# Patient Record
Sex: FEMALE | Race: WHITE | NOT HISPANIC OR LATINO | Employment: OTHER | URBAN - NONMETROPOLITAN AREA
[De-identification: names, ages, dates, MRNs, and addresses within clinical notes are randomized per-mention and may not be internally consistent; named-entity substitution may affect disease eponyms.]

---

## 2023-08-23 ENCOUNTER — OFFICE VISIT (OUTPATIENT)
Dept: GASTROENTEROLOGY | Facility: CLINIC | Age: 53
End: 2023-08-23
Payer: COMMERCIAL

## 2023-08-23 VITALS
BODY MASS INDEX: 15.33 KG/M2 | HEART RATE: 70 BPM | OXYGEN SATURATION: 98 % | HEIGHT: 65 IN | DIASTOLIC BLOOD PRESSURE: 80 MMHG | TEMPERATURE: 98.4 F | SYSTOLIC BLOOD PRESSURE: 120 MMHG | WEIGHT: 92 LBS

## 2023-08-23 DIAGNOSIS — R10.84 GENERALIZED ABDOMINAL PAIN: ICD-10-CM

## 2023-08-23 DIAGNOSIS — K50.90 CROHN'S DISEASE WITHOUT COMPLICATION, UNSPECIFIED GASTROINTESTINAL TRACT LOCATION: Primary | ICD-10-CM

## 2023-08-23 PROCEDURE — 99204 OFFICE O/P NEW MOD 45 MIN: CPT | Performed by: NURSE PRACTITIONER

## 2023-08-23 RX ORDER — GABAPENTIN 300 MG/1
300 CAPSULE ORAL NIGHTLY
COMMUNITY

## 2023-08-23 RX ORDER — ALBUTEROL SULFATE 2.5 MG/3ML
2.5 SOLUTION RESPIRATORY (INHALATION) AS NEEDED
COMMUNITY

## 2023-08-23 RX ORDER — PROMETHAZINE HYDROCHLORIDE 25 MG/1
25 TABLET ORAL EVERY 6 HOURS PRN
COMMUNITY

## 2023-08-23 RX ORDER — GABAPENTIN 100 MG/1
200 CAPSULE ORAL 2 TIMES DAILY
COMMUNITY

## 2023-08-23 NOTE — PROGRESS NOTES
Chief Complaint   Patient presents with    Crohn's Disease       PCP: Jassi Buitrago Jr., MD  REFER: Jassi Buitrago Jr., MD    Subjective     HPI    Mima Vidal is a new patient to our office referred for history of Crohn's disease.  Diagnosis age 15.   She underwent partial colectomy age of 18.   She has not been following GI.   Over past several months she complains of worsening abdominal pain.   Bowels described as varying from constipation to having 5 bowel movement per day for 3-4 days, this is not new.  She has noted certain foods have increased abdominal cramping.  No signs of GI blood loss.  She has not been able to swallow well since surgery for brain aneurysm (2018).  No heartburn, no indigestion.   She does not think chest pain is related to GERD.      Last colonoscopy apx 30 years ago.    No medication for Crohn's disease.     Past Medical History:   Diagnosis Date    Brain aneurysm     COPD (chronic obstructive pulmonary disease)     Neuropathy     PKD (polycystic kidney disease)        Past Surgical History:   Procedure Laterality Date    BRAIN SURGERY      BREAST LUMPECTOMY Left      SECTION      COLECTOMY TOTAL      NECK SURGERY      SMALL INTESTINE SURGERY      TUBAL ABDOMINAL LIGATION         Outpatient Medications Marked as Taking for the 23 encounter (Office Visit) with Arvin Ellison APRN   Medication Sig Dispense Refill    albuterol (PROVENTIL) (2.5 MG/3ML) 0.083% nebulizer solution Inhale 2.5 mg As Needed.      albuterol sulfate  (90 Base) MCG/ACT inhaler Inhale 2 puffs Every 4 (Four) Hours As Needed for Wheezing.      gabapentin (NEURONTIN) 100 MG capsule Take 2 capsules by mouth 2 (Two) Times a Day.      gabapentin (NEURONTIN) 300 MG capsule Take 1 capsule by mouth Every Night.      HYDROcodone-acetaminophen (NORCO)  MG per tablet Take 1 tablet by mouth Every 6 (Six) Hours As Needed for Moderate Pain.      predniSONE (DELTASONE) 20 MG tablet Take 1  "tablet by mouth As Needed.      promethazine (PHENERGAN) 25 MG tablet Take 1 tablet by mouth Every 6 (Six) Hours As Needed for Nausea or Vomiting.         Allergies   Allergen Reactions    Codeine Nausea And Vomiting and Rash    Demerol [Meperidine] Nausea And Vomiting and Rash       Social History     Socioeconomic History    Marital status:    Tobacco Use    Smoking status: Every Day     Packs/day: 1.00     Years: 35.00     Pack years: 35.00     Types: Cigarettes    Smokeless tobacco: Never   Substance and Sexual Activity    Alcohol use: Never    Drug use: Never    Sexual activity: Defer       Review of Systems   Constitutional:  Negative for fever and unexpected weight change.   HENT:  Negative for trouble swallowing.    Respiratory:  Negative for shortness of breath.    Cardiovascular:  Negative for chest pain.   Gastrointestinal:  Positive for abdominal pain. Negative for anal bleeding.     Objective     Vitals:    08/23/23 1410   BP: 120/80   Cuff Size: Small Adult   Pulse: 70   Temp: 98.4 øF (36.9 øC)   SpO2: 98%   Weight: 41.7 kg (92 lb)   Height: 165.1 cm (65\")     Body mass index is 15.31 kg/mý.    Physical Exam  Constitutional:       Appearance: Normal appearance. She is well-developed.   Eyes:      General: No scleral icterus.  Cardiovascular:      Heart sounds: Normal heart sounds. No murmur heard.  Pulmonary:      Effort: Pulmonary effort is normal.   Abdominal:      General: Bowel sounds are normal. There is no distension.      Palpations: Abdomen is soft.      Tenderness: There is abdominal tenderness (generalized). There is no guarding.   Skin:     General: Skin is warm and dry.      Coloration: Skin is not jaundiced.   Neurological:      Mental Status: She is alert.   Psychiatric:         Behavior: Behavior is cooperative.       Imaging Results (Most Recent)       None            Body mass index is 15.31 kg/mý.    Assessment & Plan     Diagnoses and all orders for this visit:    1. Crohn's " disease without complication, unspecified gastrointestinal tract location (Primary)    2. Generalized abdominal pain  -     Case Request; Standing  -     Implement Anesthesia Orders Day of Procedure; Standing  -     Obtain Informed Consent; Standing  -     Case Request         COLONOSCOPY WITH ANESTHESIA (N/A)    Miralax prep     I have suggested utilizing Miralax starting 7 days prior to colonoscopy to help improve prep.  I have explained stool may be loose but we do not want Mima Vidal to be uncomfortable or experiencing fecal incontinence.  Miralax should be adjusted as needed.      Negative cardiac workup July 2023 (Care Everywhere)    I provided [employer] colitis website for review    Further recommendations pending finding on Colonoscopy   Considered CTE, however, bowel habit is not new therefor will defer imaging to Dr Moon after colonoscopy     Advised pt to stop use of NSAIDs, Fish Oil, and MV 5 days prior to procedure, per Dr Moon protocol.  Tylenol based products are ok to take.  Pt verbalized understanding.      All risks, benefits, alternatives, and indications of colonoscopy procedure have been discussed with the patient. Risks to include perforation of the colon requiring possible surgery or colostomy, risk of bleeding from biopsies or removal of colon tissue, possibility of missing a colon polyp or cancer, or adverse drug reaction.  Benefits to include the diagnosis and management of disease of the colon and rectum. Alternatives to include barium enema, radiographic evaluation, lab testing or no intervention. Pt verbalizes understanding and agrees to proceed with procedure.        Arvin Ellison, APRN  08/23/23          There are no Patient Instructions on file for this visit.

## 2023-08-23 NOTE — H&P (VIEW-ONLY)
Chief Complaint   Patient presents with    Crohn's Disease       PCP: Jassi Buitrago Jr., MD  REFER: Jassi Buitrago Jr., MD    Subjective     HPI    Mima Vidal is a new patient to our office referred for history of Crohn's disease.  Diagnosis age 15.   She underwent partial colectomy age of 18.   She has not been following GI.   Over past several months she complains of worsening abdominal pain.   Bowels described as varying from constipation to having 5 bowel movement per day for 3-4 days, this is not new.  She has noted certain foods have increased abdominal cramping.  No signs of GI blood loss.  She has not been able to swallow well since surgery for brain aneurysm (2018).  No heartburn, no indigestion.   She does not think chest pain is related to GERD.      Last colonoscopy apx 30 years ago.    No medication for Crohn's disease.     Past Medical History:   Diagnosis Date    Brain aneurysm     COPD (chronic obstructive pulmonary disease)     Neuropathy     PKD (polycystic kidney disease)        Past Surgical History:   Procedure Laterality Date    BRAIN SURGERY      BREAST LUMPECTOMY Left      SECTION      COLECTOMY TOTAL      NECK SURGERY      SMALL INTESTINE SURGERY      TUBAL ABDOMINAL LIGATION         Outpatient Medications Marked as Taking for the 23 encounter (Office Visit) with Arvin Ellison APRN   Medication Sig Dispense Refill    albuterol (PROVENTIL) (2.5 MG/3ML) 0.083% nebulizer solution Inhale 2.5 mg As Needed.      albuterol sulfate  (90 Base) MCG/ACT inhaler Inhale 2 puffs Every 4 (Four) Hours As Needed for Wheezing.      gabapentin (NEURONTIN) 100 MG capsule Take 2 capsules by mouth 2 (Two) Times a Day.      gabapentin (NEURONTIN) 300 MG capsule Take 1 capsule by mouth Every Night.      HYDROcodone-acetaminophen (NORCO)  MG per tablet Take 1 tablet by mouth Every 6 (Six) Hours As Needed for Moderate Pain.      predniSONE (DELTASONE) 20 MG tablet Take 1  "tablet by mouth As Needed.      promethazine (PHENERGAN) 25 MG tablet Take 1 tablet by mouth Every 6 (Six) Hours As Needed for Nausea or Vomiting.         Allergies   Allergen Reactions    Codeine Nausea And Vomiting and Rash    Demerol [Meperidine] Nausea And Vomiting and Rash       Social History     Socioeconomic History    Marital status:    Tobacco Use    Smoking status: Every Day     Packs/day: 1.00     Years: 35.00     Pack years: 35.00     Types: Cigarettes    Smokeless tobacco: Never   Substance and Sexual Activity    Alcohol use: Never    Drug use: Never    Sexual activity: Defer       Review of Systems   Constitutional:  Negative for fever and unexpected weight change.   HENT:  Negative for trouble swallowing.    Respiratory:  Negative for shortness of breath.    Cardiovascular:  Negative for chest pain.   Gastrointestinal:  Positive for abdominal pain. Negative for anal bleeding.     Objective     Vitals:    08/23/23 1410   BP: 120/80   Cuff Size: Small Adult   Pulse: 70   Temp: 98.4 °F (36.9 °C)   SpO2: 98%   Weight: 41.7 kg (92 lb)   Height: 165.1 cm (65\")     Body mass index is 15.31 kg/m².    Physical Exam  Constitutional:       Appearance: Normal appearance. She is well-developed.   Eyes:      General: No scleral icterus.  Cardiovascular:      Heart sounds: Normal heart sounds. No murmur heard.  Pulmonary:      Effort: Pulmonary effort is normal.   Abdominal:      General: Bowel sounds are normal. There is no distension.      Palpations: Abdomen is soft.      Tenderness: There is abdominal tenderness (generalized). There is no guarding.   Skin:     General: Skin is warm and dry.      Coloration: Skin is not jaundiced.   Neurological:      Mental Status: She is alert.   Psychiatric:         Behavior: Behavior is cooperative.       Imaging Results (Most Recent)       None            Body mass index is 15.31 kg/m².    Assessment & Plan     Diagnoses and all orders for this visit:    1. Crohn's " disease without complication, unspecified gastrointestinal tract location (Primary)    2. Generalized abdominal pain  -     Case Request; Standing  -     Implement Anesthesia Orders Day of Procedure; Standing  -     Obtain Informed Consent; Standing  -     Case Request         COLONOSCOPY WITH ANESTHESIA (N/A)    Miralax prep     I have suggested utilizing Miralax starting 7 days prior to colonoscopy to help improve prep.  I have explained stool may be loose but we do not want Mima Vidal to be uncomfortable or experiencing fecal incontinence.  Miralax should be adjusted as needed.      Negative cardiac workup July 2023 (Care Everywhere)    I provided [employer] colitis website for review    Further recommendations pending finding on Colonoscopy   Considered CTE, however, bowel habit is not new therefor will defer imaging to Dr Moon after colonoscopy     Advised pt to stop use of NSAIDs, Fish Oil, and MV 5 days prior to procedure, per Dr Moon protocol.  Tylenol based products are ok to take.  Pt verbalized understanding.      All risks, benefits, alternatives, and indications of colonoscopy procedure have been discussed with the patient. Risks to include perforation of the colon requiring possible surgery or colostomy, risk of bleeding from biopsies or removal of colon tissue, possibility of missing a colon polyp or cancer, or adverse drug reaction.  Benefits to include the diagnosis and management of disease of the colon and rectum. Alternatives to include barium enema, radiographic evaluation, lab testing or no intervention. Pt verbalizes understanding and agrees to proceed with procedure.        Arvin Ellison, APRN  08/23/23          There are no Patient Instructions on file for this visit.

## 2023-09-08 ENCOUNTER — HOSPITAL ENCOUNTER (OUTPATIENT)
Facility: HOSPITAL | Age: 53
Setting detail: HOSPITAL OUTPATIENT SURGERY
Discharge: HOME | End: 2023-09-08
Attending: INTERNAL MEDICINE | Admitting: INTERNAL MEDICINE
Payer: COMMERCIAL

## 2023-09-08 ENCOUNTER — ANESTHESIA EVENT (OUTPATIENT)
Dept: GASTROENTEROLOGY | Facility: HOSPITAL | Age: 53
End: 2023-09-08
Payer: COMMERCIAL

## 2023-09-08 ENCOUNTER — DOCUMENTATION (OUTPATIENT)
Dept: GASTROENTEROLOGY | Facility: CLINIC | Age: 53
End: 2023-09-08
Payer: COMMERCIAL

## 2023-09-08 ENCOUNTER — ANESTHESIA (OUTPATIENT)
Dept: GASTROENTEROLOGY | Facility: HOSPITAL | Age: 53
End: 2023-09-08
Payer: COMMERCIAL

## 2023-09-08 VITALS
OXYGEN SATURATION: 98 % | RESPIRATION RATE: 18 BRPM | SYSTOLIC BLOOD PRESSURE: 108 MMHG | TEMPERATURE: 97.4 F | WEIGHT: 89 LBS | BODY MASS INDEX: 14.83 KG/M2 | HEART RATE: 69 BPM | DIASTOLIC BLOOD PRESSURE: 69 MMHG | HEIGHT: 65 IN

## 2023-09-08 DIAGNOSIS — R10.84 GENERALIZED ABDOMINAL PAIN: Primary | ICD-10-CM

## 2023-09-08 DIAGNOSIS — R10.84 GENERALIZED ABDOMINAL PAIN: ICD-10-CM

## 2023-09-08 PROCEDURE — 25010000002 PROPOFOL 1000 MG/100ML EMULSION: Performed by: NURSE ANESTHETIST, CERTIFIED REGISTERED

## 2023-09-08 PROCEDURE — 45380 COLONOSCOPY AND BIOPSY: CPT | Performed by: INTERNAL MEDICINE

## 2023-09-08 PROCEDURE — 88305 TISSUE EXAM BY PATHOLOGIST: CPT | Performed by: INTERNAL MEDICINE

## 2023-09-08 RX ORDER — SODIUM CHLORIDE 0.9 % (FLUSH) 0.9 %
10 SYRINGE (ML) INJECTION AS NEEDED
Status: DISCONTINUED | OUTPATIENT
Start: 2023-09-08 | End: 2023-09-08 | Stop reason: HOSPADM

## 2023-09-08 RX ORDER — SODIUM CHLORIDE 9 MG/ML
100 INJECTION, SOLUTION INTRAVENOUS CONTINUOUS
Status: DISCONTINUED | OUTPATIENT
Start: 2023-09-08 | End: 2023-09-08 | Stop reason: HOSPADM

## 2023-09-08 RX ORDER — SODIUM CHLORIDE 9 MG/ML
40 INJECTION, SOLUTION INTRAVENOUS AS NEEDED
Status: DISCONTINUED | OUTPATIENT
Start: 2023-09-08 | End: 2023-09-08 | Stop reason: HOSPADM

## 2023-09-08 RX ORDER — PROPOFOL 10 MG/ML
INJECTION, EMULSION INTRAVENOUS AS NEEDED
Status: DISCONTINUED | OUTPATIENT
Start: 2023-09-08 | End: 2023-09-08 | Stop reason: SURG

## 2023-09-08 RX ORDER — SODIUM CHLORIDE 0.9 % (FLUSH) 0.9 %
10 SYRINGE (ML) INJECTION EVERY 12 HOURS SCHEDULED
Status: DISCONTINUED | OUTPATIENT
Start: 2023-09-08 | End: 2023-09-08 | Stop reason: HOSPADM

## 2023-09-08 RX ORDER — LIDOCAINE HYDROCHLORIDE 20 MG/ML
INJECTION, SOLUTION EPIDURAL; INFILTRATION; INTRACAUDAL; PERINEURAL AS NEEDED
Status: DISCONTINUED | OUTPATIENT
Start: 2023-09-08 | End: 2023-09-08 | Stop reason: SURG

## 2023-09-08 RX ADMIN — PROPOFOL 50 MG: 10 INJECTION, EMULSION INTRAVENOUS at 10:23

## 2023-09-08 RX ADMIN — LIDOCAINE HYDROCHLORIDE 50 MG: 20 INJECTION, SOLUTION EPIDURAL; INFILTRATION; INTRACAUDAL; PERINEURAL at 10:20

## 2023-09-08 RX ADMIN — PROPOFOL 50 MG: 10 INJECTION, EMULSION INTRAVENOUS at 10:21

## 2023-09-08 RX ADMIN — PROPOFOL 50 MG: 10 INJECTION, EMULSION INTRAVENOUS at 10:28

## 2023-09-08 RX ADMIN — PROPOFOL 100 MG: 10 INJECTION, EMULSION INTRAVENOUS at 10:20

## 2023-09-08 RX ADMIN — SODIUM CHLORIDE 100 ML/HR: 9 INJECTION, SOLUTION INTRAVENOUS at 09:23

## 2023-09-08 NOTE — ANESTHESIA PREPROCEDURE EVALUATION
Anesthesia Evaluation     Patient summary reviewed   no history of anesthetic complications:   NPO Solid Status: > 8 hours             Airway   Mallampati: II  Dental      Pulmonary    (+) a smoker Current, COPD,  (-) sleep apnea, no home oxygen  Cardiovascular   Exercise tolerance: good (4-7 METS)    (-) pacemaker, past MI, cardiac stents, CABG      Neuro/Psych  (+) CVA (aneurysmal SAH) residual symptoms  (-) seizures  GI/Hepatic/Renal/Endo    (+) renal disease (PKD)    Musculoskeletal     Abdominal    Substance History      OB/GYN          Other                        Anesthesia Plan    ASA 3     MAC     intravenous induction     Anesthetic plan, risks, benefits, and alternatives have been provided, discussed and informed consent has been obtained with: patient.      CODE STATUS:

## 2023-09-11 LAB
CYTO UR: NORMAL
LAB AP CASE REPORT: NORMAL
Lab: NORMAL
PATH REPORT.FINAL DX SPEC: NORMAL
PATH REPORT.GROSS SPEC: NORMAL

## 2023-09-14 ENCOUNTER — TELEPHONE (OUTPATIENT)
Dept: GASTROENTEROLOGY | Facility: CLINIC | Age: 53
End: 2023-09-14
Payer: COMMERCIAL

## 2023-09-14 DIAGNOSIS — R10.84 GENERALIZED ABDOMINAL PAIN: Primary | ICD-10-CM

## 2023-09-20 ENCOUNTER — OFFICE VISIT (OUTPATIENT)
Dept: GASTROENTEROLOGY | Facility: CLINIC | Age: 53
End: 2023-09-20
Payer: COMMERCIAL

## 2023-09-20 ENCOUNTER — TELEPHONE (OUTPATIENT)
Dept: GASTROENTEROLOGY | Facility: CLINIC | Age: 53
End: 2023-09-20
Payer: COMMERCIAL

## 2023-09-20 VITALS
DIASTOLIC BLOOD PRESSURE: 70 MMHG | TEMPERATURE: 97.6 F | WEIGHT: 92 LBS | HEART RATE: 77 BPM | HEIGHT: 65 IN | SYSTOLIC BLOOD PRESSURE: 110 MMHG | OXYGEN SATURATION: 100 % | BODY MASS INDEX: 15.33 KG/M2

## 2023-09-20 DIAGNOSIS — R10.84 GENERALIZED ABDOMINAL PAIN: ICD-10-CM

## 2023-09-20 DIAGNOSIS — K50.019 CROHN'S DISEASE OF SMALL INTESTINE WITH COMPLICATION: Primary | ICD-10-CM

## 2023-09-20 NOTE — TELEPHONE ENCOUNTER
Referral entered to Dr Sachin Perla at   [facility]    Entered per patient request    [phone]

## 2023-09-20 NOTE — PROGRESS NOTES
Chief Complaint   Patient presents with    Colonoscopy     23 colon had ct today has crohn's       PCP: Jassi Buitrago Jr., MD  REFER: No ref. provider found    Subjective     HPI           Follow up after her c-scope/cte both of which showed terminal ileitis  Still having bowel changes  Colicky abdominal pain   Assoc nausea  Has tried pred in the past without improvement      Past Medical History:   Diagnosis Date    Brain aneurysm     COPD (chronic obstructive pulmonary disease)     Neuropathy     PKD (polycystic kidney disease)        Past Surgical History:   Procedure Laterality Date    BRAIN SURGERY      BREAST LUMPECTOMY Left      SECTION      COLECTOMY TOTAL      correction: bowel resection at age 19 for crohns.    COLONOSCOPY N/A 2023    Procedure: COLONOSCOPY WITH ANESTHESIA;  Surgeon: Jamie Moon DO;  Location: [facility] ENDOSCOPY;  Service: Gastroenterology;  Laterality: N/A;  preop; abdominal pain- hx of crohns   postop crohns - IC valve ulcerations   PCP Jassi Buitrago    NECK SURGERY      SMALL INTESTINE SURGERY      TUBAL ABDOMINAL LIGATION         Outpatient Medications Marked as Taking for the 23 encounter (Office Visit) with Jamie Moon DO   Medication Sig Dispense Refill    albuterol (PROVENTIL) (2.5 MG/3ML) 0.083% nebulizer solution Inhale 2.5 mg As Needed.      albuterol sulfate  (90 Base) MCG/ACT inhaler Inhale 2 puffs Every 4 (Four) Hours As Needed for Wheezing.      gabapentin (NEURONTIN) 100 MG capsule Take 2 capsules by mouth 2 (Two) Times a Day.      gabapentin (NEURONTIN) 300 MG capsule Take 1 capsule by mouth Every Night.      HYDROcodone-acetaminophen (NORCO)  MG per tablet Take 1 tablet by mouth Every 6 (Six) Hours As Needed for Moderate Pain.      promethazine (PHENERGAN) 25 MG tablet Take 1 tablet by mouth Every 6 (Six) Hours As Needed for Nausea or Vomiting.         Allergies   Allergen Reactions    Codeine Nausea And Vomiting and Rash     "Demerol [Meperidine] Nausea And Vomiting and Rash       Social History     Socioeconomic History    Marital status:    Tobacco Use    Smoking status: Every Day     Packs/day: 1.00     Years: 35.00     Pack years: 35.00     Types: Cigarettes    Smokeless tobacco: Never   Vaping Use    Vaping Use: Never used   Substance and Sexual Activity    Alcohol use: Never    Drug use: Never    Sexual activity: Defer       Review of Systems   Constitutional:  Negative for fever and unexpected weight change.   HENT:  Negative for trouble swallowing.    Respiratory:  Negative for shortness of breath.    Cardiovascular:  Negative for chest pain.   Gastrointestinal:  Negative for abdominal pain and anal bleeding.     Objective     Vitals:    09/20/23 1303   BP: 110/70   Pulse: 77   Temp: 97.6 °F (36.4 °C)   SpO2: 100%   Weight: 41.7 kg (92 lb)   Height: 165.1 cm (65\")     Body mass index is 15.31 kg/m².    Physical Exam  Constitutional:       Appearance: Normal appearance. She is well-developed.   Eyes:      General: No scleral icterus.  Cardiovascular:      Heart sounds: Normal heart sounds. No murmur heard.  Pulmonary:      Effort: Pulmonary effort is normal.   Abdominal:      General: Bowel sounds are normal. There is no distension.      Palpations: Abdomen is soft.      Tenderness: There is no abdominal tenderness. There is no guarding.   Skin:     General: Skin is warm and dry.      Coloration: Skin is not jaundiced.   Neurological:      Mental Status: She is alert.   Psychiatric:         Behavior: Behavior is cooperative.       Imaging Results (Most Recent)       None            Body mass index is 15.31 kg/m².    Assessment & Plan     Diagnoses and all orders for this visit:    1. Crohn's disease of small intestine with complication (Primary)       Will refer to dr Chon Breaux [place] in [place]  Hold on meds for now  Reviewed CTE and colon results      * Surgery not found *        Advised pt to stop use of NSAIDs, Fish Oil, and " MV 5 days prior to procedure, per Dr Moon protocol.  Tylenol based products are ok to take.  Pt verbalized understanding.        Jamie Moon, DO  09/20/23          There are no Patient Instructions on file for this visit.

## 2023-09-20 NOTE — TELEPHONE ENCOUNTER
CTE dated 9/18/23 reviewed    Circumferential wall thickening involving TI to the ileocecal valve with mild stranding of adjacent surrounding fat compatible with patient history of crohn's disease.     Colonoscopy 9/8/23      Previous diagnosis of Crohn's - not currently on medications      Recommendations?

## 2023-09-21 NOTE — TELEPHONE ENCOUNTER
Sent referral yesterday. They will contact patient in one week.   Spoke with patient she will call us if they do not contact her.     Thank you

## (undated) DEVICE — MASK,OXYGEN,MED CONC,ADLT,7' TUB, UC: Brand: PENDING

## (undated) DEVICE — FRCP BX RADJAW4 NDL 2.8 240 STD OG

## (undated) DEVICE — SENSR O2 OXIMAX FNGR A/ 18IN NONSTR

## (undated) DEVICE — YANKAUER,BULB TIP WITH VENT: Brand: ARGYLE

## (undated) DEVICE — Device: Brand: DEFENDO AIR/WATER/SUCTION AND BIOPSY VALVE

## (undated) DEVICE — THE CHANNEL CLEANING BRUSH IS A NYLON FLEXI BRUSH ATTACHED TO A FLEXIBLE PLASTIC SHEATH DESIGNED TO SAFELY REMOVE DEBRIS FROM FLEXIBLE ENDOSCOPES.